# Patient Record
Sex: FEMALE | Race: WHITE | NOT HISPANIC OR LATINO | URBAN - METROPOLITAN AREA
[De-identification: names, ages, dates, MRNs, and addresses within clinical notes are randomized per-mention and may not be internally consistent; named-entity substitution may affect disease eponyms.]

---

## 2022-11-10 ENCOUNTER — OFFICE VISIT (OUTPATIENT)
Dept: URBAN - METROPOLITAN AREA CLINIC 118 | Facility: CLINIC | Age: 62
End: 2022-11-10
Payer: COMMERCIAL

## 2022-11-10 VITALS
BODY MASS INDEX: 35.52 KG/M2 | SYSTOLIC BLOOD PRESSURE: 123 MMHG | HEART RATE: 66 BPM | HEIGHT: 66 IN | DIASTOLIC BLOOD PRESSURE: 67 MMHG | WEIGHT: 221 LBS | TEMPERATURE: 97.7 F

## 2022-11-10 DIAGNOSIS — E66.9 OBESITY (BMI 30-39.9): ICD-10-CM

## 2022-11-10 DIAGNOSIS — K75.81 NASH (NONALCOHOLIC STEATOHEPATITIS): ICD-10-CM

## 2022-11-10 DIAGNOSIS — E11.9 TYPE 2 DIABETES MELLITUS WITHOUT COMPLICATION, WITHOUT LONG-TERM CURRENT USE OF INSULIN: ICD-10-CM

## 2022-11-10 DIAGNOSIS — R16.0 HEPATOMEGALY: ICD-10-CM

## 2022-11-10 PROCEDURE — 99204 OFFICE O/P NEW MOD 45 MIN: CPT | Performed by: INTERNAL MEDICINE

## 2022-11-10 RX ORDER — SITAGLIPTIN PHOSPHATE 100 MG
TAKE 1 TABLET (100 MG) BY ORAL ROUTE ONCE DAILY TABLET ORAL 1
Qty: 0 | Refills: 0 | Status: ACTIVE | COMMUNITY
Start: 1900-01-01

## 2022-11-10 RX ORDER — DAPAGLIFLOZIN 10 MG/1
TAKE 1 TABLET (10 MG) BY ORAL ROUTE ONCE TABLET, FILM COATED ORAL 1
Qty: 0 | Refills: 0 | Status: ACTIVE | COMMUNITY
Start: 1900-01-01

## 2022-11-10 RX ORDER — METFORMIN HYDROCHLORIDE 1000 MG/1
TAKE 1 TABLET (1,000 MG) BY ORAL ROUTE 2 TIMES PER DAY TABLET, COATED ORAL 2
Qty: 0 | Refills: 0 | Status: ACTIVE | COMMUNITY
Start: 1900-01-01

## 2022-11-10 NOTE — HPI-TODAY'S VISIT:
The patient is referred back to our clinic for fatty liver disease.  She was last seen for this several years ago.  She has intermittently positive liver enzymes but a history of negative serology several years ago.  She has never had a liver biopsy.  An ultrasound done in 2022 shows an enlarged liver.  She does have a family history of fatty liver in her father as well as her brother.  In addition the patient has chronic diabetes with a current hemoglobin A1c of 6.8%, as well as significant obesity.  Her maximum weight is 245 pounds and is currently 221 pounds with a BMI of 35.  She is not currently on a low-carb diet.  She consumes no significant EtOH.

## 2022-11-14 ENCOUNTER — DASHBOARD ENCOUNTERS (OUTPATIENT)
Age: 62
End: 2022-11-14

## 2022-11-14 PROBLEM — 442685003: Status: ACTIVE | Noted: 2022-11-14

## 2022-11-14 PROBLEM — 162864005: Status: ACTIVE | Noted: 2022-11-14

## 2022-11-14 PROBLEM — 313436004: Status: ACTIVE | Noted: 2022-11-14

## 2023-02-08 ENCOUNTER — OFFICE VISIT (OUTPATIENT)
Dept: URBAN - METROPOLITAN AREA CLINIC 118 | Facility: CLINIC | Age: 63
End: 2023-02-08

## 2023-02-14 ENCOUNTER — OFFICE VISIT (OUTPATIENT)
Dept: URBAN - METROPOLITAN AREA CLINIC 118 | Facility: CLINIC | Age: 63
End: 2023-02-14

## 2023-02-15 ENCOUNTER — OFFICE VISIT (OUTPATIENT)
Dept: URBAN - METROPOLITAN AREA CLINIC 118 | Facility: CLINIC | Age: 63
End: 2023-02-15